# Patient Record
Sex: FEMALE | Race: WHITE
[De-identification: names, ages, dates, MRNs, and addresses within clinical notes are randomized per-mention and may not be internally consistent; named-entity substitution may affect disease eponyms.]

---

## 2019-03-31 ENCOUNTER — HOSPITAL ENCOUNTER (EMERGENCY)
Dept: HOSPITAL 91 - E/R | Age: 19
Discharge: HOME | End: 2019-03-31
Payer: COMMERCIAL

## 2019-03-31 ENCOUNTER — HOSPITAL ENCOUNTER (EMERGENCY)
Dept: HOSPITAL 10 - E/R | Age: 19
Discharge: HOME | End: 2019-03-31
Payer: COMMERCIAL

## 2019-03-31 VITALS — BODY MASS INDEX: 22.3 KG/M2 | HEIGHT: 55 IN | WEIGHT: 96.34 LBS

## 2019-03-31 VITALS — HEART RATE: 106 BPM | RESPIRATION RATE: 19 BRPM | DIASTOLIC BLOOD PRESSURE: 65 MMHG | SYSTOLIC BLOOD PRESSURE: 95 MMHG

## 2019-03-31 DIAGNOSIS — R06.02: ICD-10-CM

## 2019-03-31 DIAGNOSIS — J20.9: Primary | ICD-10-CM

## 2019-03-31 LAB
ADD MAN DIFF?: NO
ADD UMIC: NO
ALANINE AMINOTRANSFERASE: 147 IU/L (ref 13–69)
ALBUMIN/GLOBULIN RATIO: 1.31
ALBUMIN: 5 G/DL (ref 3.3–4.9)
ALKALINE PHOSPHATASE: 78 IU/L (ref 42–121)
AMYLASE: 67 U/L (ref 11–123)
ANION GAP: 12 (ref 5–13)
ASPARTATE AMINO TRANSFERASE: 109 IU/L (ref 15–46)
BASOPHIL #: 0 10^3/UL (ref 0–0.1)
BASOPHILS %: 0.3 % (ref 0–2)
BILIRUBIN,DIRECT: 0 MG/DL (ref 0–0.2)
BILIRUBIN,TOTAL: 0.3 MG/DL (ref 0.2–1.3)
BLOOD UREA NITROGEN: 6 MG/DL (ref 7–20)
CALCIUM: 10 MG/DL (ref 8.4–10.2)
CARBON DIOXIDE: 25 MMOL/L (ref 21–31)
CHLORIDE: 104 MMOL/L (ref 97–110)
CREATININE: 0.5 MG/DL (ref 0.44–1)
EOSINOPHILS #: 0.1 10^3/UL (ref 0–0.5)
EOSINOPHILS %: 0.9 % (ref 0–7)
GLOBULIN: 3.8 G/DL (ref 1.3–3.2)
GLUCOSE: 138 MG/DL (ref 70–220)
HEMATOCRIT: 40.8 % (ref 37–47)
HEMOGLOBIN: 13.5 G/DL (ref 12–16)
IMMATURE GRANS #M: 0.06 10^3/UL (ref 0–0.03)
IMMATURE GRANS % (M): 0.6 % (ref 0–0.43)
INR: 0.89
LIPASE: 46 U/L (ref 23–300)
LYMPHOCYTES #: 1 10^3/UL (ref 0.8–2.9)
LYMPHOCYTES %: 9.1 % (ref 18–55)
MEAN CORPUSCULAR HEMOGLOBIN: 28.2 PG (ref 29–33)
MEAN CORPUSCULAR HGB CONC: 33.1 G/DL (ref 32–37)
MEAN CORPUSCULAR VOLUME: 85.2 FL (ref 72–104)
MEAN PLATELET VOLUME: 10.4 FL (ref 7.4–10.4)
MONOCYTE #: 0.5 10^3/UL (ref 0.3–0.9)
MONOCYTES %: 4.5 % (ref 0–13)
NEUTROPHIL #: 8.9 10^3/UL (ref 1.6–7.5)
NEUTROPHILS %: 84.6 % (ref 30–74)
NUCLEATED RED BLOOD CELLS #: 0 10^3/UL (ref 0–0)
NUCLEATED RED BLOOD CELLS%: 0 /100WBC (ref 0–0)
PARTIAL THROMBOPLASTIN TIME: 28.1 SEC (ref 23–35)
PLATELET COUNT: 237 10^3/UL (ref 140–415)
POTASSIUM: 3.8 MMOL/L (ref 3.5–5.1)
PROTIME: 12.1 SEC (ref 11.9–14.9)
PT RATIO: 0.9
RED BLOOD COUNT: 4.79 10^6/UL (ref 4.2–5.4)
RED CELL DISTRIBUTION WIDTH: 12 % (ref 11.5–14.5)
SODIUM: 141 MMOL/L (ref 135–144)
TOTAL PROTEIN: 8.8 G/DL (ref 6.1–8.1)
UR ASCORBIC ACID: NEGATIVE MG/DL
UR BILIRUBIN (DIP): NEGATIVE MG/DL
UR BLOOD (DIP): NEGATIVE MG/DL
UR CLARITY: (no result)
UR COLOR: YELLOW
UR GLUCOSE (DIP): NEGATIVE MG/DL
UR KETONES (DIP): NEGATIVE MG/DL
UR LEUKOCYTE ESTERASE (DIP): NEGATIVE LEU/UL
UR MUCUS: (no result) /HPF
UR NITRITE (DIP): NEGATIVE MG/DL
UR PH (DIP): 5 (ref 5–9)
UR RBC: 1 /HPF (ref 0–5)
UR SPECIFIC GRAVITY (DIP): 1.02 (ref 1–1.03)
UR SQUAMOUS EPITHELIAL CELL: (no result) /HPF
UR TOTAL PROTEIN (DIP): NEGATIVE MG/DL
UR UROBILINOGEN (DIP): NEGATIVE MG/DL
UR WBC: 0 /HPF (ref 0–5)
WHITE BLOOD COUNT: 10.5 10^3/UL (ref 4.8–10.8)

## 2019-03-31 PROCEDURE — 85730 THROMBOPLASTIN TIME PARTIAL: CPT

## 2019-03-31 PROCEDURE — 71275 CT ANGIOGRAPHY CHEST: CPT

## 2019-03-31 PROCEDURE — 96375 TX/PRO/DX INJ NEW DRUG ADDON: CPT

## 2019-03-31 PROCEDURE — 96374 THER/PROPH/DIAG INJ IV PUSH: CPT

## 2019-03-31 PROCEDURE — 94664 DEMO&/EVAL PT USE INHALER: CPT

## 2019-03-31 PROCEDURE — 87086 URINE CULTURE/COLONY COUNT: CPT

## 2019-03-31 PROCEDURE — 85025 COMPLETE CBC W/AUTO DIFF WBC: CPT

## 2019-03-31 PROCEDURE — 80053 COMPREHEN METABOLIC PANEL: CPT

## 2019-03-31 PROCEDURE — 71045 X-RAY EXAM CHEST 1 VIEW: CPT

## 2019-03-31 PROCEDURE — 99285 EMERGENCY DEPT VISIT HI MDM: CPT

## 2019-03-31 PROCEDURE — 93005 ELECTROCARDIOGRAM TRACING: CPT

## 2019-03-31 PROCEDURE — 81001 URINALYSIS AUTO W/SCOPE: CPT

## 2019-03-31 PROCEDURE — 81003 URINALYSIS AUTO W/O SCOPE: CPT

## 2019-03-31 PROCEDURE — 83690 ASSAY OF LIPASE: CPT

## 2019-03-31 PROCEDURE — 82150 ASSAY OF AMYLASE: CPT

## 2019-03-31 PROCEDURE — 85610 PROTHROMBIN TIME: CPT

## 2019-03-31 PROCEDURE — 81025 URINE PREGNANCY TEST: CPT

## 2019-03-31 PROCEDURE — 87400 INFLUENZA A/B EACH AG IA: CPT

## 2019-03-31 RX ADMIN — ACETAMINOPHEN 1 MG: 500 TABLET, FILM COATED ORAL at 16:42

## 2019-03-31 RX ADMIN — IPRATROPIUM BROMIDE 1 MG: 0.5 SOLUTION RESPIRATORY (INHALATION) at 17:52

## 2019-03-31 RX ADMIN — LORAZEPAM 1 MG: 2 INJECTION, SOLUTION INTRAMUSCULAR; INTRAVENOUS at 20:03

## 2019-03-31 RX ADMIN — IOHEXOL 1 ML/S: 350 INJECTION, SOLUTION INTRAVENOUS at 20:31

## 2019-03-31 RX ADMIN — VASOPRESSIN 1 ML/S: 20 INJECTION, SOLUTION INTRAMUSCULAR; SUBCUTANEOUS at 20:31

## 2019-03-31 RX ADMIN — ALBUTEROL SULFATE 1 MG: 2.5 SOLUTION RESPIRATORY (INHALATION) at 17:52

## 2019-03-31 RX ADMIN — THIAMINE HYDROCHLORIDE 1 MLS/HR: 100 INJECTION, SOLUTION INTRAMUSCULAR; INTRAVENOUS at 16:42

## 2019-03-31 RX ADMIN — SODIUM CHLORIDE 1 ML: 9 INJECTION, SOLUTION INTRAMUSCULAR; INTRAVENOUS; SUBCUTANEOUS at 20:31

## 2019-03-31 RX ADMIN — ONDANSETRON HYDROCHLORIDE 1 MG: 2 INJECTION, SOLUTION INTRAMUSCULAR; INTRAVENOUS at 16:42

## 2019-03-31 RX ADMIN — KETOROLAC TROMETHAMINE 1 MG: 30 INJECTION, SOLUTION INTRAMUSCULAR at 16:42

## 2019-03-31 NOTE — ERD
ER Documentation


Chief Complaint


Chief Complaint





fever, sore throat, runny nose, intermittent vomiting since Wed HPI


This is a 18-year-old female that presents to the emergency department 


complaining of tactile fever with shaking and chills for the past 5 days.  


Patient indicates she is also had a runny nose, productive cough, difficulty 


breathing with shortness of breath at rest.  She denies any recent travel or 


prolonged immobilization.  She is not on oral contraceptive pills.  She also 


states she has a sore throat.  She also had experienced intermittent nausea over


the past 5 days.  She did have one episode of nonbloody nonbilious emesis prior 


to arrival.  However she denies any abdominal pain.  She said no frequency urg


ency or dysuria.  She denies a headache or neck pain.  She has had no sick 


contacts.  She has diffuse myalgias.  She took NyQuil but has not taken any 


other medication





ROS


All systems reviewed and are negative except as per history of present illness.





Medications


Home Meds


Active Scripts


Acetaminophen* (Acetaminophen*) 500 MG Extra Strength Tablet, 500 MG PO Q4H PRN 


for PAIN AND OR ELEVATED TEMP, #20 TAB


   Prov:YOBANI ENNIS MD         3/31/19


Ibuprofen* (Motrin*) 600 Mg Tab, 600 MG PO Q6H PRN for PAIN AND OR ELEVATED TEMP


, #30 TAB


   Prov:YOBANI ENNIS MD         3/31/19


Azithromycin* (Zithromax*) 250 Mg Tablet, 250 MG PO .ZPACK AS DIRECTED, #6 TAB


   TAKE 500 MG (2 TABS) THE FIRST DAY THEN 250 MG (1 TAB) DAYS 2-5


   Prov:YOBANI ENNIS MD         3/31/19


Discontinued Reported Medications


[Eye Drops]   No Conflict Check


   6/5/10





Allergies


Allergies:  


Coded Allergies:  


     No Known Drug Allergies (Verified  Allergy, Mild, 3/31/19)





PMhx/Soc


History of Surgery:  No


Anesthesia Reaction:  No


Hx Neurological Disorder:  No


Hx Respiratory Disorders:  No


Hx Cardiac Disorders:  No


Hx Psychiatric Problems:  No


Hx Miscellaneous Medical Probl:  No


Hx Alcohol Use:  No


Hx Substance Use:  No


Hx Tobacco Use:  No


Smoking Status:  Never smoker





Physical Exam


Vitals





Vital Signs


  Date      Temp   Pulse  Resp  B/P (MAP)   Pulse Ox  O2         O2 Flow    FiO2


Time                                                  Delivery   Rate


   3/31/19   98.4    124    20      106/62       100  Room Air


     18:30                            (77)


   3/31/19           115    20                    99                          21


     17:55


   3/31/19           120    18      108/73       100  Room Air


     17:11                            (85)


   3/31/19  101.2    141    26      114/66        99


     15:53                            (82)





Physical Exam


Constitutional:Well-developed. Well-nourished.


HEENT:Normocephalic. Atraumatic.Pupils were equal round reactive to light.  Very


dry mucous membranes.No tonsillar exudates significant erythremia of the orop


harynx with uvula midline.  No tonsillar enlargement.  No trismus.  No brawny 


induration.


Neck: No nuchal rigidity. No lymphadenopathy. No posterior cervical spine 


tenderness or step-offs.


Respiratory: Not using accessory muscles of respiration.Lungs were clear to 


auscultation bilaterally. No rhonchi. No rales.  Bilateral  wheezing. 


Cardiovascular: Regular rate regular rhythm.No murmurs. No rubs were 


appreciated.S1, S2 normal. Distal pulses are palpable 2+ bilaterally.


GI: Abdomen was soft. Nontender. Non Distended. No pulsatile abdominal masses or


bruits. No rebound. No guarding. Bowel sounds were present and normal. 


Muscle skeletal: Full range of motion of both the upper and lower extremities 


bilaterally.Normal muscle tone.No assymetrical calf tenderness or swelling. 


Skin: No petechia, no purpura. No lesions on the palms or the soles of the feet.


No maculopapular rash.


NEURO: Patient was alert, awake, orientated x3.No facial droop. Gait observed 


and normal with no ataxia.Speech had regular rate and rhythm. No focal 


neurological deficits.


Result Diagram:  


3/31/19 1635                                                                    


           3/31/19 1635





Results 24 hrs





Laboratory Tests


      Test
                                3/31/19
16:25    3/31/19
16:35


      POC Beta HCG, Qualitative            NEGATIVE


      White Blood Count                                     10.5 10^3/ul


      Red Blood Count                                       4.79 10^6/ul


      Hemoglobin                                               13.5 g/dl


      Hematocrit                                                  40.8 %


      Mean Corpuscular Volume                                    85.2 fl


      Mean Corpuscular Hemoglobin                                28.2 pg


      Mean Corpuscular Hemoglobin
Concent  
                  33.1 g/dl 



      Red Cell Distribution Width                                 12.0 %


      Platelet Count                                         237 10^3/UL


      Mean Platelet Volume                                       10.4 fl


      Immature Granulocytes %                                    0.600 %


      Neutrophils %                                               84.6 %


      Lymphocytes %                                                9.1 %


      Monocytes %                                                  4.5 %


      Eosinophils %                                                0.9 %


      Basophils %                                                  0.3 %


      Nucleated Red Blood Cells %                            0.0 /100WBC


      Immature Granulocytes #                              0.060 10^3/ul


      Neutrophils #                                          8.9 10^3/ul


      Lymphocytes #                                          1.0 10^3/ul


      Monocytes #                                            0.5 10^3/ul


      Eosinophils #                                          0.1 10^3/ul


      Basophils #                                            0.0 10^3/ul


      Nucleated Red Blood Cells #                            0.0 10^3/ul


      Prothrombin Time                                          12.1 Sec


      Prothrombin Time Ratio                                         0.9


      INR International Normalized
Ratio   
                       0.89 



      Activated Partial
Thromboplast Time  
                   28.1 Sec 



      Urine Color                                         YELLOW


      Urine Clarity
                       
              SLIGHTLY
CLOUDY


      Urine pH                                                       5.0


      Urine Specific Gravity                                       1.023


      Urine Ketones                                       NEGATIVE mg/dL


      Urine Nitrite                                       NEGATIVE mg/dL


      Urine Bilirubin                                     NEGATIVE mg/dL


      Urine Urobilinogen                                  NEGATIVE mg/dL


      Urine Leukocyte Esterase
            
              NEGATIVE
Bereket/ul


      Urine Microscopic RBC                                       1 /HPF


      Urine Microscopic WBC                                       0 /HPF


      Urine Squamous Epithelial
Cells      
              FEW /HPF 



      Urine Mucus                                         MODERATE /HPF


      Urine Hemoglobin                                    NEGATIVE mg/dL


      Urine Glucose                                       NEGATIVE mg/dL


      Urine Total Protein                                 NEGATIVE mg/dl


      Sodium Level                                            141 mmol/L


      Potassium Level                                         3.8 mmol/L


      Chloride Level                                          104 mmol/L


      Carbon Dioxide Level                                     25 mmol/L


      Anion Gap                                                       12


      Blood Urea Nitrogen                                        6 mg/dl


      Creatinine                                              0.50 mg/dl


      Est Glomerular Filtrat Rate
mL/min   
              > 60 mL/min 



      Glucose Level                                            138 mg/dl


      Calcium Level                                           10.0 mg/dl


      Total Bilirubin                                          0.3 mg/dl


      Direct Bilirubin                                        0.00 mg/dl


      Indirect Bilirubin                                       0.3 mg/dl


      Aspartate Amino Transf
(AST/SGOT)    
                   109 IU/L 



      Alanine Aminotransferase
(ALT/SGPT)  
                   147 IU/L 



      Alkaline Phosphatase                                       78 IU/L


      Total Protein                                             8.8 g/dl


      Albumin                                                   5.0 g/dl


      Globulin                                                 3.80 g/dl


      Albumin/Globulin Ratio                                        1.31


      Amylase Level                                               67 U/L


      Lipase                                                      46 U/L





Current Medications


 Medications
   Dose
          Sig/Cassi
       Start Time
   Status  Last


 (Trade)       Ordered        Route
 PRN     Stop Time              Admin
Dose


                              Reason                                Admin


 Sodium         1,000 ml @ 
   Q1H STAT
      3/31/19       DC           3/31/19


Chloride       1,000 mls/hr   IV
            16:34
                       16:42



                                             3/31/19 17:33


 Ondansetron    4 mg           ONCE  STAT
    3/31/19       DC           3/31/19


HCl
  (Zofran                 IV
            16:34
                       16:42



Inj)                                         3/31/19 16:37


 Ketorolac
     30 mg          ONCE  STAT
    3/31/19       DC           3/31/19


Tromethamine
                 IV
            16:34
                       16:42



 (Toradol)                                   3/31/19 16:37


                1,000 mg       ONCE  STAT
    3/31/19       DC           3/31/19


Acetaminophen                 PO
            16:34
                       16:42




  (Tylenol                                  3/31/19 16:37


Tab)


 Albuterol
     5 mg           ONCE  STAT
    3/31/19       DC           3/31/19


(Proventil
                   NEB
           17:37
                       17:52



0.083% (Neb))                                3/31/19 17:39


 Ipratropium
   0.5 mg         ONCE  STAT
    3/31/19       DC           3/31/19


Bromide
                      NEB
           17:37
                       17:52



(Atrovent                                    3/31/19 17:39


0.02%



(Neb))


 Lorazepam
     0.5 mg         ONCE  ONCE
    3/31/19       DC       



(Ativan)                      IV
            19:30



                                             3/31/19 19:31








Procedures/MDM


This 18-year-old female presents to the emergency department febrile tachycardic


and appeared to have a flulike illness.  The patient was not treated for 


suspected sepsis as her clinical picture again appeared to be a flulike illness 


with viral pharyngitis.  The patient was placed on a cardiac monitor continuous 


pulse oximetry and IV access was attempted by nursing staff.  The patient showed


signs of severe clinical dehydration.  She was given antipyretics IV fluids 


Toradol.  Influenza swab was negative.  The patient did a chest radiograph due 


to her new onset wheezing that showed no infiltrates no pneumothorax or pleural 


effusions.  Wheezing resolved after nebulizer treatment.  However my clinical 


suspicion was high for pulmonary herbals and therefore did feel is necessary to 


obtain a CT scan of her chest.  This was reviewed by myself the radiologist as 


no evidence of a pulmonary embolism.





12 Lead EKG tracing ordered and reviewed by myself showed: 


Sinus tachycardia 125 bpm and no arrhythmia.


[place] interval normal.


QRS duration normal.


No ST segment elevation


No ST segment depression. No changes consistent with acute ischemia. 





The patient had no leukocytosis.  There is no severe left leg abnormalities.  


There is no evidence of urinary tract infection.





Observation Note:


Time:   4 hours


Family Hx:   No Hypertension


Evaluation:   Multiple exams showed improving symptoms and no evidence of sepsis


and I did feel that the patient be safely discharged home as her symptoms have 


significantly improved.





The patient was discharged home in fair condition. They were instructed to 


return to the emergency department at any time if there was any worsening of 


their condition. The patient stated they would follow up with their PCP in the 


next 24-48 hours to initiate a suitable medication regimen under the care of 


their PCP as well as to allow their PCP to monitor any drug reactions. The 


patient was discharged home with prescriptions after they gave informed consent 


to the new medication.  They were also fully informed by myself on the adverse 


effects and adverse drug interactions in order to provide adequate safeguards to


prevent possible adverse reactions to medications.





Departure


Diagnosis:  


   Primary Impression:  


   Influenza-like symptoms


   Additional Impression:  


   Acute bronchitis


   Bronchitis organism:  unspecified organism  Qualified Codes:  J20.9 - Acute 


   bronchitis, unspecified


Condition:  Fair


Patient Instructions:  Bronchitis With Wheezing (Adult)











YOBANI ENNIS MD            Mar 31, 2019 19:38